# Patient Record
Sex: MALE | Race: BLACK OR AFRICAN AMERICAN | NOT HISPANIC OR LATINO | URBAN - METROPOLITAN AREA
[De-identification: names, ages, dates, MRNs, and addresses within clinical notes are randomized per-mention and may not be internally consistent; named-entity substitution may affect disease eponyms.]

---

## 2024-10-30 ENCOUNTER — TELEPHONE (OUTPATIENT)
Dept: GENETICS | Facility: HOSPITAL | Age: 39
End: 2024-10-30
Payer: OTHER GOVERNMENT

## 2024-10-30 NOTE — TELEPHONE ENCOUNTER
Called pt to remind him of his upcoming genetic counseling appt on Monday. Reviewed family history with patient.

## 2024-11-04 ENCOUNTER — CLINICAL SUPPORT (OUTPATIENT)
Dept: GENETICS | Facility: HOSPITAL | Age: 39
End: 2024-11-04
Payer: OTHER GOVERNMENT

## 2024-11-04 DIAGNOSIS — Z80.3 FAMILY HISTORY OF BREAST CANCER: ICD-10-CM

## 2024-11-04 DIAGNOSIS — Z80.41 FAMILY HISTORY OF OVARIAN CANCER: ICD-10-CM

## 2024-11-04 DIAGNOSIS — Z13.79 GENETIC TESTING: Primary | ICD-10-CM

## 2024-11-04 DIAGNOSIS — Z80.8 FAMILY HISTORY OF GLIOBLASTOMA: ICD-10-CM

## 2024-11-04 PROCEDURE — 96040: CPT | Performed by: GENETIC COUNSELOR, MS

## 2024-11-04 NOTE — PROGRESS NOTES
Zeyad Esposito, a 39-year-old male, was seen for genetic counseling due to a family history of cancer Mr. Esposito has no personal history of cancer. His most recent colonoscopy was in 2016, and he reports a history of no polyps. He has not had his PSA level checked. He was interested in discussing his risk for a hereditary cancer syndrome. Mr. Esposito was interested in pursuing a multigene panel, and therefore the CancerNext Expanded panel was ordered through [employer] which analyzes 71 genes associated with an increased cancer risk. Results from this testing are expected in approximately 2-3 weeks.    FAMILY HISTORY (see attached pedigree):   Sister:   Leukemia, 35  Mother:  Breast cancer, 58  Mat Aunt:  Ovarian cancer, 50s  Mat Uncle:  Cancer, unknown type  Mat Grandfather: Cancer, unknown type  Mat Great Aunt: Ovarian cancer  Father:   Liver cancer, 59  Pat Uncle:  Glioblastoma, 50s  Pat Grandmother: Cancer, unknown type  Pat Grandfather: Colon/prostate cancer    Records not available regarding family history.     RISK ASSESSMENT:  Mr. Esposito's family history of cancer led to concern regarding a hereditary cancer syndrome. NCCN guidelines for genetic testing for BRCA1/2 states that individuals with a relative with ovarian cancer at any age may consider genetic testing. With Mr. Esposito maternal aunt's diagnosis, he would meet this criteria. Despite this, genetic testing is still available to him. This assessment is based on the family history information provided at the time of the appointment.     GENETIC COUNSELING: (30 minutes) We reviewed the family history information in detail. Cases of cancer follow three general patterns: sporadic, familial, and hereditary. While most cancer is sporadic, some cases appear to occur in family clusters. These cases are said to be familial and account for 10-20% of certain cancer cases. Familial cases may be due to a combination of shared genes and environmental factors among  family members.  In even fewer families, the cancer is said to be inherited, and the genes responsible for the cancer are known.      Family histories typical of hereditary cancer syndromes usually include multiple first- and second-degree relatives diagnosed with cancer types that define a syndrome. These cases tend to be diagnosed at younger-than-expected ages and can be bilateral or multifocal. The cancer in these families follows an autosomal dominant inheritance pattern, which indicates the likely presence of a mutation in a cancer susceptibility gene. Children and siblings of an individual believed to carry this mutation have a 50% chance of inheriting that mutation, thereby inheriting the increased risk to develop cancer. These mutations can be passed down from the maternal or the paternal lineage.    Hereditary breast cancer accounts for 5-10% of all cases of breast cancer. A significant proportion of hereditary breast cancer can be attributed to mutations in the BRCA1 and BRCA2 genes. Mutations in these genes confer an increased risk for breast cancer, ovarian cancer, male breast cancer, prostate cancer and pancreatic cancer. There are other genes known to contribute to breast cancer risk.     There are other, more rare, hereditary cancer syndromes. Some of these conditions have well defined cancer risks and established management guidelines. Other genes that can be tested for have been   more recently described, and there may be less data regarding the risks and therefore may not have established management guidelines. We discussed these limitations at length. Based on Mr. Esposito's family history and his desire to get more information regarding his personal risks he opted to pursue testing through a panel evaluating several other genes known to increase the risk for cancer.    GENETIC TESTING:  The risks, benefits and limitations of genetic testing and implications for clinical management following testing  were reviewed. DNA test results can influence decisions regarding screening and prevention. Genetic testing can have significant psychological implications for both individuals and families. Also discussed was the possibility of employment and insurance discrimination based on genetic test results and the federal and states laws that are in place to prevent this.         We discussed multigene panel testing, which would involve testing BRCA1/2 and an additional 69 genes associated with an increased cancer risk. The benefits and limitations of genetic testing were discussed, and Mr. Esposito decided to pursue testing of the genes via the panel. The implications of a positive or negative test result were discussed. We also discussed the importance of testing on an affected relative. We discussed the possibility that, in some cases, genetic test results may be ambiguous due to the identification of a genetic variant. These variants may or may not be associated with an increased cancer risk. With multigene panel testing, it is not uncommon for a variant of uncertain significance (VUS) to be identified. If a VUS is identified, testing family members is not recommended and screening recommendations are made based on the family history. The laboratories that perform genetic testing work to reclassify the VUS and send out an amended report if and when a VUS is reclassified. The majority of variant findings are ultimately reclassified to a negative result. Given his family history, a negative test result does not eliminate all cancer risk, although the risk would not be as high as it would with positive genetic testing.     PLAN:  Genetic testing via the CancerNext Expanded panel through [facility] was ordered. He had his blood drawn today at [facility]. Results are expected in 2-3 weeks. We will contact Mr. Esposito with his results once they are received. If he has any questions in the meantime, he is welcome to contact  us at [phone].      Trudy Piedra MS, [facility] [place] [facility]  Licensed Certified Genetic Counselor

## 2024-11-19 ENCOUNTER — TELEPHONE (OUTPATIENT)
Dept: GENETICS | Facility: HOSPITAL | Age: 39
End: 2024-11-19
Payer: OTHER GOVERNMENT

## 2024-11-19 ENCOUNTER — DOCUMENTATION (OUTPATIENT)
Dept: GENETICS | Facility: HOSPITAL | Age: 39
End: 2024-11-19
Payer: OTHER GOVERNMENT

## 2024-11-19 NOTE — TELEPHONE ENCOUNTER
Spoke with patient and disclosed negative genetic results. Informed patient these results would sent to his referring provider. Patient requested a copy be mailed to him.

## 2024-11-19 NOTE — PROGRESS NOTES
Zeyad Esposito, a 39-year-old male, was seen for genetic counseling due to a family history of cancer Mr. Esposito has no personal history of cancer. His most recent colonoscopy was in 2016, and he reports a history of no polyps. He has not had his PSA level checked. He was interested in discussing his risk for a hereditary cancer syndrome. Mr. Esposito was interested in pursuing a multigene panel, and therefore the CancerNext Expanded panel was ordered through [employer] which analyzes 71 genes associated with an increased cancer risk. Genetic testing was negative for deleterious mutations in the 71 genes included on this panel (see attached results). These normal results were discussed with Mr. Esposito by telephone on 11/19/24.    FAMILY HISTORY (see attached pedigree):   Sister:   Leukemia, 35  Mother:  Breast cancer, 58  Mat Aunt:  Ovarian cancer, 50s  Mat Uncle:  Cancer, unknown type  Mat Grandfather: Cancer, unknown type  Father:   Liver cancer, 59  Pat Uncle:  Glioblastoma, 50s  Pat Grandmother: Cancer, unknown type  Pat Grandfather: Colon/prostate cancer    Records not available regarding family history.     RISK ASSESSMENT:  Mr. Esposito's family history of cancer led to concern regarding a hereditary cancer syndrome. NCCN guidelines for genetic testing for BRCA1/2 states that individuals with a relative with ovarian cancer at any age may consider genetic testing. With Mr. Esposito maternal aunt's diagnosis, he would meet this criteria. Despite this, genetic testing is still available to him. This assessment is based on the family history information provided at the time of the appointment.     GENETIC COUNSELING: (30 minutes) We reviewed the family history information in detail. Cases of cancer follow three general patterns: sporadic, familial, and hereditary. While most cancer is sporadic, some cases appear to occur in family clusters. These cases are said to be familial and account for 10-20% of certain cancer cases.  Familial cases may be due to a combination of shared genes and environmental factors among family members.  In even fewer families, the cancer is said to be inherited, and the genes responsible for the cancer are known.      Family histories typical of hereditary cancer syndromes usually include multiple first- and second-degree relatives diagnosed with cancer types that define a syndrome. These cases tend to be diagnosed at younger-than-expected ages and can be bilateral or multifocal. The cancer in these families follows an autosomal dominant inheritance pattern, which indicates the likely presence of a mutation in a cancer susceptibility gene. Children and siblings of an individual believed to carry this mutation have a 50% chance of inheriting that mutation, thereby inheriting the increased risk to develop cancer. These mutations can be passed down from the maternal or the paternal lineage.    Hereditary breast cancer accounts for 5-10% of all cases of breast cancer. A significant proportion of hereditary breast cancer can be attributed to mutations in the BRCA1 and BRCA2 genes. Mutations in these genes confer an increased risk for breast cancer, ovarian cancer, male breast cancer, prostate cancer and pancreatic cancer. There are other genes known to contribute to breast cancer risk.     There are other, more rare, hereditary cancer syndromes. Some of these conditions have well defined cancer risks and established management guidelines. Other genes that can be tested for have been   more recently described, and there may be less data regarding the risks and therefore may not have established management guidelines. We discussed these limitations at length. Based on Mr. Esposito's family history and his desire to get more information regarding his personal risks he opted to pursue testing through a panel evaluating several other genes known to increase the risk for cancer.    GENETIC TESTING:  The risks, benefits and  limitations of genetic testing and implications for clinical management following testing were reviewed. DNA test results can influence decisions regarding screening and prevention. Genetic testing can have significant psychological implications for both individuals and families. Also discussed was the possibility of employment and insurance discrimination based on genetic test results and the federal and states laws that are in place to prevent this.         We discussed multigene panel testing, which would involve testing BRCA1/2 and an additional 69 genes associated with an increased cancer risk. The benefits and limitations of genetic testing were discussed, and Mr. Esposito decided to pursue testing of the genes via the panel. The implications of a positive or negative test result were discussed. We also discussed the importance of testing on an affected relative. We discussed the possibility that, in some cases, genetic test results may be ambiguous due to the identification of a genetic variant. These variants may or may not be associated with an increased cancer risk. With multigene panel testing, it is not uncommon for a variant of uncertain significance (VUS) to be identified. If a VUS is identified, testing family members is not recommended and screening recommendations are made based on the family history. The laboratories that perform genetic testing work to reclassify the VUS and send out an amended report if and when a VUS is reclassified. The majority of variant findings are ultimately reclassified to a negative result. Given his family history, a negative test result does not eliminate all cancer risk, although the risk would not be as high as it would with positive genetic testing.     TEST RESULTS: Genetic testing was negative for known deleterious mutations by sequencing and rearrangement and RNA analysis testing of the 71 genes included on the CancerNext Expanded panel. This negative result greatly  lowers the risk of a hereditary cancer syndrome for Mr. Esposito. It is possible that the family history is due to a hereditary cancer syndrome that Mr. Esposito did not happen to inherit. Other relatives could still consider genetic testing. This assessment is based on the information provided at the time of the consultation.    PLAN:  Genetic counseling remains available for Mr. Esposito and his family. If he has any questions or concerns in the future, he is welcome to contact us at [phone].      Trudy Piedra MS, [facility] [place] [facility]  Licensed Certified Genetic Counselor      Cc: SENG Godwin